# Patient Record
Sex: MALE | Race: WHITE | Employment: FULL TIME | URBAN - METROPOLITAN AREA
[De-identification: names, ages, dates, MRNs, and addresses within clinical notes are randomized per-mention and may not be internally consistent; named-entity substitution may affect disease eponyms.]

---

## 2021-10-22 ENCOUNTER — HOSPITAL ENCOUNTER (EMERGENCY)
Age: 26
Discharge: HOME | End: 2021-10-22

## 2021-10-22 VITALS
HEART RATE: 95 BPM | BODY MASS INDEX: 35.03 KG/M2 | WEIGHT: 218 LBS | TEMPERATURE: 100.8 F | OXYGEN SATURATION: 96 % | SYSTOLIC BLOOD PRESSURE: 143 MMHG | RESPIRATION RATE: 16 BRPM | HEIGHT: 66 IN | DIASTOLIC BLOOD PRESSURE: 85 MMHG

## 2021-10-22 DIAGNOSIS — R79.89 ELEVATED LFTS: ICD-10-CM

## 2021-10-22 DIAGNOSIS — U07.1 COVID-19 VIRUS INFECTION: ICD-10-CM

## 2021-10-22 DIAGNOSIS — R03.0 ELEVATED BLOOD PRESSURE READING: ICD-10-CM

## 2021-10-22 DIAGNOSIS — L05.01 PILONIDAL ABSCESS: Primary | ICD-10-CM

## 2021-10-22 LAB
A/G RATIO: 1.5 (ref 1.1–2.2)
ALBUMIN SERPL-MCNC: 5 G/DL (ref 3.4–5)
ALP BLD-CCNC: 138 U/L (ref 40–129)
ALT SERPL-CCNC: 104 U/L (ref 10–40)
ANION GAP SERPL CALCULATED.3IONS-SCNC: 14 MMOL/L (ref 3–16)
AST SERPL-CCNC: 59 U/L (ref 15–37)
BASOPHILS ABSOLUTE: 0.1 K/UL (ref 0–0.2)
BASOPHILS RELATIVE PERCENT: 0.6 %
BILIRUB SERPL-MCNC: 0.8 MG/DL (ref 0–1)
BUN BLDV-MCNC: 10 MG/DL (ref 7–20)
CALCIUM SERPL-MCNC: 10 MG/DL (ref 8.3–10.6)
CHLORIDE BLD-SCNC: 97 MMOL/L (ref 99–110)
CO2: 26 MMOL/L (ref 21–32)
CREAT SERPL-MCNC: 0.7 MG/DL (ref 0.9–1.3)
EOSINOPHILS ABSOLUTE: 0.1 K/UL (ref 0–0.6)
EOSINOPHILS RELATIVE PERCENT: 0.7 %
GFR AFRICAN AMERICAN: >60
GFR NON-AFRICAN AMERICAN: >60
GLOBULIN: 3.3 G/DL
GLUCOSE BLD-MCNC: 100 MG/DL (ref 70–99)
HCT VFR BLD CALC: 46.3 % (ref 40.5–52.5)
HEMOGLOBIN: 15.5 G/DL (ref 13.5–17.5)
INFLUENZA A: NOT DETECTED
INFLUENZA B: NOT DETECTED
LACTIC ACID, SEPSIS: 1.3 MMOL/L (ref 0.4–1.9)
LYMPHOCYTES ABSOLUTE: 2.4 K/UL (ref 1–5.1)
LYMPHOCYTES RELATIVE PERCENT: 16.7 %
MCH RBC QN AUTO: 27.8 PG (ref 26–34)
MCHC RBC AUTO-ENTMCNC: 33.5 G/DL (ref 31–36)
MCV RBC AUTO: 83 FL (ref 80–100)
MONOCYTES ABSOLUTE: 1.1 K/UL (ref 0–1.3)
MONOCYTES RELATIVE PERCENT: 7.6 %
NEUTROPHILS ABSOLUTE: 10.7 K/UL (ref 1.7–7.7)
NEUTROPHILS RELATIVE PERCENT: 74.4 %
PDW BLD-RTO: 13 % (ref 12.4–15.4)
PLATELET # BLD: 283 K/UL (ref 135–450)
PMV BLD AUTO: 7.4 FL (ref 5–10.5)
POTASSIUM REFLEX MAGNESIUM: 4.1 MMOL/L (ref 3.5–5.1)
RBC # BLD: 5.58 M/UL (ref 4.2–5.9)
SARS-COV-2 RNA, RT PCR: DETECTED
SODIUM BLD-SCNC: 137 MMOL/L (ref 136–145)
TOTAL PROTEIN: 8.3 G/DL (ref 6.4–8.2)
WBC # BLD: 14.4 K/UL (ref 4–11)

## 2021-10-22 PROCEDURE — 87205 SMEAR GRAM STAIN: CPT

## 2021-10-22 PROCEDURE — 80053 COMPREHEN METABOLIC PANEL: CPT

## 2021-10-22 PROCEDURE — 99283 EMERGENCY DEPT VISIT LOW MDM: CPT

## 2021-10-22 PROCEDURE — 6370000000 HC RX 637 (ALT 250 FOR IP): Performed by: PHYSICIAN ASSISTANT

## 2021-10-22 PROCEDURE — 6360000002 HC RX W HCPCS: Performed by: PHYSICIAN ASSISTANT

## 2021-10-22 PROCEDURE — 10080 I&D PILONIDAL CYST SIMPLE: CPT

## 2021-10-22 PROCEDURE — 2580000003 HC RX 258: Performed by: PHYSICIAN ASSISTANT

## 2021-10-22 PROCEDURE — 87636 SARSCOV2 & INF A&B AMP PRB: CPT

## 2021-10-22 PROCEDURE — 83605 ASSAY OF LACTIC ACID: CPT

## 2021-10-22 PROCEDURE — 87070 CULTURE OTHR SPECIMN AEROBIC: CPT

## 2021-10-22 PROCEDURE — 85025 COMPLETE CBC W/AUTO DIFF WBC: CPT

## 2021-10-22 PROCEDURE — 96374 THER/PROPH/DIAG INJ IV PUSH: CPT

## 2021-10-22 RX ORDER — ACETAMINOPHEN 325 MG/1
650 TABLET ORAL ONCE
Status: COMPLETED | OUTPATIENT
Start: 2021-10-22 | End: 2021-10-22

## 2021-10-22 RX ORDER — METRONIDAZOLE 500 MG/1
500 TABLET ORAL 3 TIMES DAILY
Qty: 21 TABLET | Refills: 0 | Status: SHIPPED | OUTPATIENT
Start: 2021-10-22 | End: 2021-10-29

## 2021-10-22 RX ORDER — KETOROLAC TROMETHAMINE 30 MG/ML
15 INJECTION, SOLUTION INTRAMUSCULAR; INTRAVENOUS ONCE
Status: COMPLETED | OUTPATIENT
Start: 2021-10-22 | End: 2021-10-22

## 2021-10-22 RX ORDER — CIPROFLOXACIN 500 MG/1
500 TABLET, FILM COATED ORAL 2 TIMES DAILY
Qty: 14 TABLET | Refills: 0 | Status: SHIPPED | OUTPATIENT
Start: 2021-10-22 | End: 2021-10-29

## 2021-10-22 RX ORDER — CIPROFLOXACIN 500 MG/1
500 TABLET, FILM COATED ORAL ONCE
Status: COMPLETED | OUTPATIENT
Start: 2021-10-22 | End: 2021-10-22

## 2021-10-22 RX ORDER — IBUPROFEN 800 MG/1
800 TABLET ORAL EVERY 8 HOURS PRN
Qty: 20 TABLET | Refills: 0 | Status: SHIPPED | OUTPATIENT
Start: 2021-10-22

## 2021-10-22 RX ORDER — 0.9 % SODIUM CHLORIDE 0.9 %
1000 INTRAVENOUS SOLUTION INTRAVENOUS ONCE
Status: COMPLETED | OUTPATIENT
Start: 2021-10-22 | End: 2021-10-22

## 2021-10-22 RX ORDER — METRONIDAZOLE 250 MG/1
500 TABLET ORAL ONCE
Status: COMPLETED | OUTPATIENT
Start: 2021-10-22 | End: 2021-10-22

## 2021-10-22 RX ADMIN — METRONIDAZOLE 500 MG: 250 TABLET ORAL at 19:34

## 2021-10-22 RX ADMIN — ACETAMINOPHEN 650 MG: 325 TABLET ORAL at 18:44

## 2021-10-22 RX ADMIN — SODIUM CHLORIDE 1000 ML: 9 INJECTION, SOLUTION INTRAVENOUS at 18:45

## 2021-10-22 RX ADMIN — KETOROLAC TROMETHAMINE 15 MG: 30 INJECTION, SOLUTION INTRAMUSCULAR at 18:44

## 2021-10-22 RX ADMIN — CIPROFLOXACIN 500 MG: 500 TABLET, FILM COATED ORAL at 19:34

## 2021-10-22 ASSESSMENT — ENCOUNTER SYMPTOMS
COLOR CHANGE: 1
SHORTNESS OF BREATH: 0
ABDOMINAL PAIN: 0
ROS SKIN COMMENTS: PILONIDAL ABSCESS
VOMITING: 0
COUGH: 1

## 2021-10-22 ASSESSMENT — PAIN SCALES - GENERAL
PAINLEVEL_OUTOF10: 8
PAINLEVEL_OUTOF10: 10

## 2021-10-22 ASSESSMENT — PAIN DESCRIPTION - PAIN TYPE: TYPE: ACUTE PAIN

## 2021-10-22 ASSESSMENT — PAIN DESCRIPTION - LOCATION: LOCATION: BUTTOCKS

## 2021-10-22 NOTE — ED PROVIDER NOTES
[address] [facility]  EMERGENCY DEPARTMENT ENCOUNTER        Pt Name: Madelyn Benitez  MRN: [id number]  [place] 1995  Date of evaluation: 10/22/2021  Provider: Link Porter PA-C  PCP: ZHANNA Hand CNP    Shared Visit or Autonomous Visit: LEN. I have evaluated this patient. My supervising physician was available for consultation. CHIEF COMPLAINT       Chief Complaint   Patient presents with    Cyst     Pt states that he has a cyst at the top of his buttocks that has been there since Tuesday. HISTORY OF PRESENT ILLNESS   (Location/Symptom, Timing/Onset, Context/Setting, Quality, Duration, Modifying Factors, Severity)  Note limiting factors. Madelyn Benitez is a 27 y.o. male presenting to the emergency department for evaluation of pilonidal abscess, states he had surgery for this in 2018 when he was in [place], symptoms returned the past 3 days with pain redness and swelling at the top of his buttocks. Fever headache body aches and cough today. States he just got his second Vasquez Peter COVID-19 vaccine today. No vomiting. No abdominal pain. No chest pain or shortness of breath. The history is provided by the patient. Abscess  Location:  Pelvis  Pelvic abscess location:  Gluteal cleft  Size:  3cm  Abscess quality: fluctuance, induration, painful and redness    Red streaking: no    Duration:  3 days  Progression:  Worsening  Chronicity:  New  Context: not diabetes    Associated symptoms: fatigue, fever and headaches    Associated symptoms: no vomiting          Nursing Notes were reviewed    REVIEW OF SYSTEMS    (2-9 systems for level 4, 10 or more for level 5)     Review of Systems   Constitutional: Positive for fatigue and fever. Respiratory: Positive for cough. Negative for shortness of breath. Cardiovascular: Negative for chest pain. Gastrointestinal: Negative for abdominal pain and vomiting. Musculoskeletal: Positive for myalgias.    Skin: Positive for color change (erythema). Pilonidal abscess   Neurological: Positive for headaches. All other systems reviewed and are negative. Positives and Pertinent negatives as per HPI. PAST MEDICAL HISTORY     Past Medical History:   Diagnosis Date    Allergic          SURGICAL HISTORY   History reviewed. No pertinent surgical history. CURRENTMEDICATIONS       Previous Medications    LORATADINE (CLEAR-ATADINE) 10 MG TABLET    Take 10 mg by mouth daily. ALLERGIES     Penicillins    FAMILYHISTORY       Family History   Problem Relation Age of Onset    Other Other         DRUG AND AL SYNDROME          SOCIAL HISTORY       Social History     Socioeconomic History    Marital status: Single     Spouse name: None    Number of children: None    Years of education: None    Highest education level: None   Occupational History    None   Tobacco Use    Smoking status: Current Some Day Smoker     Types: Cigarettes    Smokeless tobacco: Never Used   Substance and Sexual Activity    Alcohol use: Yes    Drug use: Never    Sexual activity: None   Other Topics Concern    None   Social History Narrative    None     Social Determinants of Health     Financial Resource Strain:     Difficulty of Paying Living Expenses:    Food Insecurity:     Worried About Running Out of Food in the Last Year:     [address] in the Last Year:    Transportation Needs:     Lack of Transportation (Medical):      Lack of Transportation (Non-Medical):    Physical Activity:     Days of Exercise per Week:     Minutes of Exercise per Session:    Stress:     Feeling of Stress :    Social Connections:     Frequency of Communication with Friends and Family:     Frequency of Social Gatherings with Friends and Family:     Attends Religion Services:     Active Member of Clubs or Organizations:     Attends Club or Organization Meetings:     Marital Status:    Intimate Partner Violence:     Fear of Current or Ex-Partner:     Emotionally Abused:     Physically Abused:     Sexually Abused:        SCREENINGS             PHYSICAL EXAM    (up to 7 for level 4, 8 or more for level 5)     ED Triage Vitals [10/22/21 1532]   BP Temp Temp Source Pulse Resp SpO2 Height Weight   130/85 100.8 °F (38.2 °C) Oral 107 16 100 % 5' 6\" (1.676 m) 218 lb (98.9 kg)       Physical Exam  Vitals and nursing note reviewed. Constitutional:       Appearance: He is well-developed. He is not toxic-appearing. HENT:      Head: Normocephalic and atraumatic. Mouth/Throat:      Pharynx: Oropharynx is clear. No pharyngeal swelling or posterior oropharyngeal erythema. Eyes:      Conjunctiva/sclera: Conjunctivae normal.      Pupils: Pupils are equal, round, and reactive to light. Cardiovascular:      Rate and Rhythm: Regular rhythm. Tachycardia present. Heart sounds: Normal heart sounds. Pulmonary:      Effort: Pulmonary effort is normal. No respiratory distress. Breath sounds: Normal breath sounds. No stridor. No wheezing, rhonchi or rales. Abdominal:      General: Bowel sounds are normal.      Palpations: Abdomen is soft. Abdomen is not rigid. Tenderness: There is no abdominal tenderness. There is no guarding or rebound. Musculoskeletal:         General: Normal range of motion. Cervical back: Normal range of motion and neck supple. Skin:     General: Skin is warm and dry. Comments: 3cm pilonidal abscess with tenderness, redness, induration and central fluctuance. No red streaks. No crepitus. Neurological:      Mental Status: He is alert and oriented to person, place, and time. GCS: GCS eye subscore is 4. GCS verbal subscore is 5. GCS motor subscore is 6. Cranial Nerves: No cranial nerve deficit. Sensory: No sensory deficit. Motor: No abnormal muscle tone.       Coordination: Coordination normal.   Psychiatric:         Speech: Speech normal.         Behavior: Behavior normal. Thought Content:  Thought content normal.         DIAGNOSTIC RESULTS   LABS:    Labs Reviewed   COVID-19 & INFLUENZA COMBO - Abnormal; Notable for the following components:       Result Value    SARS-CoV-2 RNA, RT PCR DETECTED (*)     All other components within normal limits    Narrative:     Performed at:  [facility],  [facility]   Phone [phone]   CBC WITH AUTO DIFFERENTIAL - Abnormal; Notable for the following components:    WBC 14.4 (*)     Neutrophils Absolute 10.7 (*)     All other components within normal limits    Narrative:     Performed at:  [facility] [employer],  [facility]   Phone [phone]   COMPREHENSIVE METABOLIC PANEL W/ REFLEX TO MG FOR LOW K - Abnormal; Notable for the following components:    Chloride 97 (*)     Glucose 100 (*)     CREATININE 0.7 (*)     Total Protein 8.3 (*)     Alkaline Phosphatase 138 (*)      (*)     AST 59 (*)     All other components within normal limits    Narrative:     Performed at:  [facility],  [facility] [employer]   Phone [phone]   CULTURE, WOUND   LACTATE, SEPSIS    Narrative:     Performed at:  [place] [address],  [facility]   Phone [phone]   LACTATE, SEPSIS     Results for orders placed or performed during the hospital encounter of 10/22/21   COVID-19 & Influenza Combo    Specimen: Nasopharyngeal Swab   Result Value Ref Range    SARS-CoV-2 RNA, RT PCR DETECTED (A) NOT DETECTED    INFLUENZA A NOT DETECTED NOT DETECTED    INFLUENZA B NOT DETECTED NOT DETECTED   CBC Auto Differential   Result Value Ref Range    WBC 14.4 (H) 4.0 - 11.0 K/uL    RBC 5.58 4.20 - 5.90 M/uL    Hemoglobin 15.5 13.5 - 17.5 g/dL    Hematocrit 46.3 40.5 - 52.5 %    MCV 83.0 80.0 - 100.0 fL    MCH 27.8 26.0 - 34.0 pg    MCHC 33.5 31.0 - 36.0 g/dL RDW 13.0 12.4 - 15.4 %    Platelets 949 872 - 554 K/uL    MPV 7.4 5.0 - 10.5 fL    Neutrophils % 74.4 %    Lymphocytes % 16.7 %    Monocytes % 7.6 %    Eosinophils % 0.7 %    Basophils % 0.6 %    Neutrophils Absolute 10.7 (H) 1.7 - 7.7 K/uL    Lymphocytes Absolute 2.4 1.0 - 5.1 K/uL    Monocytes Absolute 1.1 0.0 - 1.3 K/uL    Eosinophils Absolute 0.1 0.0 - 0.6 K/uL    Basophils Absolute 0.1 0.0 - 0.2 K/uL   Comprehensive Metabolic Panel w/ Reflex to MG   Result Value Ref Range    Sodium 137 136 - 145 mmol/L    Potassium reflex Magnesium 4.1 3.5 - 5.1 mmol/L    Chloride 97 (L) 99 - 110 mmol/L    CO2 26 21 - 32 mmol/L    Anion Gap 14 3 - 16    Glucose 100 (H) 70 - 99 mg/dL    BUN 10 7 - 20 mg/dL    CREATININE 0.7 (L) 0.9 - 1.3 mg/dL    GFR Non-African American >60 >60    GFR African American >60 >60    Calcium 10.0 8.3 - 10.6 mg/dL    Total Protein 8.3 (H) 6.4 - 8.2 g/dL    Albumin 5.0 3.4 - 5.0 g/dL    Albumin/Globulin Ratio 1.5 1.1 - 2.2    Total Bilirubin 0.8 0.0 - 1.0 mg/dL    Alkaline Phosphatase 138 (H) 40 - 129 U/L     (H) 10 - 40 U/L    AST 59 (H) 15 - 37 U/L    Globulin 3.3 Not Established g/dL   Lactate, Sepsis   Result Value Ref Range    Lactic Acid, Sepsis 1.3 0.4 - 1.9 mmol/L         All other labs were within normal range or not returned as of this dictation. EKG: All EKG's are interpreted by the Emergency Department Physician in the absence of a cardiologist.  Please see their note for interpretation of EKG. RADIOLOGY:   Non-plain film images such as CT, Ultrasound and MRI are read by the radiologist. Plain radiographic images are visualized andpreliminarily interpreted by the  ED Provider with the below findings:        Interpretation perthe Radiologist below, if available at the time of this note:    No orders to display     No results found.         PROCEDURES   Unless otherwise noted below, none     Incision/Drainage    Date/Time: 10/22/2021 7:05 PM  Performed by: Winsome Sutherland, SHELLEY  Authorized by: Kristan Burdick PA-C     Consent:     Consent obtained:  Verbal    Consent given by:  Patient    Risks discussed:  Bleeding, incomplete drainage and pain  Location:     Type:  Abscess    Size:  3cm    Location:  Anogenital    Anogenital location:  Pilonidal  Pre-procedure details:     Skin preparation:  Betadine  Anesthesia (see MAR for exact dosages): Anesthesia method:  Local infiltration    Local anesthetic:  Lidocaine 1% WITH epi (3cc)  Procedure type:     Complexity:  Simple  Procedure details:     Incision types:  Single straight (1cm)    Incision depth:  Dermal    Scalpel blade:  11    Wound management:  Probed and deloculated    Drainage:  Purulent    Drainage amount:  Copious    Packing materials:  1/4 in gauze  Post-procedure details:     Patient tolerance of procedure: Tolerated well, no immediate complications        CRITICAL CARE TIME   N/A    CONSULTS:  None      EMERGENCY DEPARTMENT COURSE and DIFFERENTIAL DIAGNOSIS/MDM:   Vitals:    Vitals:    10/22/21 1532 10/22/21 1915   BP: 130/85 (!) 149/98   Pulse: 107 98   Resp: 16    Temp: 100.8 °F (38.2 °C)    TempSrc: Oral    SpO2: 100% 95%   Weight: 218 lb (98.9 kg)    Height: 5' 6\" (1.676 m)        Patient was given thefollowing medications:  Medications   0.9 % sodium chloride bolus (1,000 mLs IntraVENous New Bag 10/22/21 1845)   ciprofloxacin (CIPRO) tablet 500 mg (has no administration in time range)   metroNIDAZOLE (FLAGYL) tablet 500 mg (has no administration in time range)   ketorolac (TORADOL) injection 15 mg (15 mg IntraVENous Given 10/22/21 1844)   acetaminophen (TYLENOL) tablet 650 mg (650 mg Oral Given 10/22/21 1844)         ED course  Patient presented to the ER for evaluation of pilonidal abscess. Low-grade fever here on arrival 100.8. States he has not felt well for the past couple of days having a headache body aches and cough. Covid swab was obtained and is positive here.   On exam he has 3 cm pilonidal abscess medications on file              (Please note that portions ofthis note were completed with a voice recognition program.  Efforts were made to edit the dictations but occasionally words are mis-transcribed.)    John Renee PA-C (electronically signed)            Buster Jacob PA-C  10/22/21 1927

## 2021-10-24 LAB
GRAM STAIN RESULT: ABNORMAL
WOUND/ABSCESS: ABNORMAL

## 2021-10-25 ENCOUNTER — CARE COORDINATION (OUTPATIENT)
Dept: CARE COORDINATION | Age: 26
End: 2021-10-25

## 2021-10-25 NOTE — CARE COORDINATION
ACM called to follow up with patient from ER visit 10/22/2021. Left message on machine, waiting for return call.

## 2021-10-26 ENCOUNTER — CARE COORDINATION (OUTPATIENT)
Dept: CARE COORDINATION | Age: 26
End: 2021-10-26

## 2022-07-19 ENCOUNTER — HOSPITAL ENCOUNTER (EMERGENCY)
Age: 27
Discharge: HOME | End: 2022-07-19
Payer: OTHER GOVERNMENT

## 2022-07-19 VITALS
WEIGHT: 220 LBS | TEMPERATURE: 98.3 F | SYSTOLIC BLOOD PRESSURE: 150 MMHG | HEIGHT: 67 IN | OXYGEN SATURATION: 99 % | DIASTOLIC BLOOD PRESSURE: 92 MMHG | BODY MASS INDEX: 34.53 KG/M2 | HEART RATE: 90 BPM | RESPIRATION RATE: 16 BRPM

## 2022-07-19 DIAGNOSIS — L05.01 PILONIDAL ABSCESS: Primary | ICD-10-CM

## 2022-07-19 PROCEDURE — 10080 I&D PILONIDAL CYST SIMPLE: CPT

## 2022-07-19 PROCEDURE — 99283 EMERGENCY DEPT VISIT LOW MDM: CPT

## 2022-07-19 PROCEDURE — 6370000000 HC RX 637 (ALT 250 FOR IP): Performed by: PHYSICIAN ASSISTANT

## 2022-07-19 RX ORDER — SULFAMETHOXAZOLE AND TRIMETHOPRIM 800; 160 MG/1; MG/1
1 TABLET ORAL 2 TIMES DAILY
Qty: 20 TABLET | Refills: 0 | Status: SHIPPED | OUTPATIENT
Start: 2022-07-19 | End: 2022-07-29

## 2022-07-19 RX ORDER — CEPHALEXIN 500 MG/1
500 CAPSULE ORAL 4 TIMES DAILY
Qty: 28 CAPSULE | Refills: 0 | Status: SHIPPED | OUTPATIENT
Start: 2022-07-19 | End: 2022-07-26

## 2022-07-19 RX ORDER — CEPHALEXIN 500 MG/1
500 CAPSULE ORAL ONCE
Status: COMPLETED | OUTPATIENT
Start: 2022-07-19 | End: 2022-07-19

## 2022-07-19 RX ORDER — SULFAMETHOXAZOLE AND TRIMETHOPRIM 800; 160 MG/1; MG/1
1 TABLET ORAL ONCE
Status: COMPLETED | OUTPATIENT
Start: 2022-07-19 | End: 2022-07-19

## 2022-07-19 RX ADMIN — CEPHALEXIN 500 MG: 500 CAPSULE ORAL at 11:41

## 2022-07-19 RX ADMIN — SULFAMETHOXAZOLE AND TRIMETHOPRIM 1 TABLET: 800; 160 TABLET ORAL at 11:41

## 2022-07-19 ASSESSMENT — ENCOUNTER SYMPTOMS
CONSTIPATION: 0
CHEST TIGHTNESS: 0
ABDOMINAL PAIN: 0
SORE THROAT: 0
NAUSEA: 0
SINUS PRESSURE: 0
COUGH: 0
DIARRHEA: 0
EYE DISCHARGE: 0
RHINORRHEA: 0
SINUS PAIN: 0
SHORTNESS OF BREATH: 0
VOMITING: 0
EYE REDNESS: 0

## 2022-07-19 ASSESSMENT — PAIN DESCRIPTION - FREQUENCY: FREQUENCY: CONTINUOUS

## 2022-07-19 ASSESSMENT — PAIN - FUNCTIONAL ASSESSMENT
PAIN_FUNCTIONAL_ASSESSMENT: 0-10
PAIN_FUNCTIONAL_ASSESSMENT: PREVENTS OR INTERFERES WITH MANY ACTIVE NOT PASSIVE ACTIVITIES

## 2022-07-19 ASSESSMENT — PAIN DESCRIPTION - LOCATION: LOCATION: BUTTOCKS

## 2022-07-19 ASSESSMENT — PAIN DESCRIPTION - ORIENTATION: ORIENTATION: INNER

## 2022-07-19 ASSESSMENT — PAIN DESCRIPTION - PAIN TYPE: TYPE: ACUTE PAIN

## 2022-07-19 ASSESSMENT — LIFESTYLE VARIABLES: HOW OFTEN DO YOU HAVE A DRINK CONTAINING ALCOHOL: MONTHLY OR LESS

## 2022-07-19 ASSESSMENT — PAIN DESCRIPTION - ONSET: ONSET: ON-GOING

## 2022-07-19 ASSESSMENT — PAIN DESCRIPTION - DESCRIPTORS: DESCRIPTORS: DISCOMFORT

## 2022-07-19 ASSESSMENT — PAIN SCALES - GENERAL: PAINLEVEL_OUTOF10: 8

## 2022-07-19 NOTE — ED NOTES
Patient discharged in stable, ambulatory condition with all documented belongings. This nurse reviewed discharge instructions, pt verbalized understanding.        Zak Rader RN  07/19/22 0763

## 2022-07-19 NOTE — DISCHARGE INSTRUCTIONS
Take all antibiotics until complete  Use warm compresses to the area 3-5 times per day. Please return or see your primary care provider in 2 to 3 days for a wound recheck.

## 2022-07-19 NOTE — ED PROVIDER NOTES
[address] ED  EMERGENCY DEPARTMENT ENCOUNTER        Pt Name: Freya Gorman  MRN: [id number]  [place] 1995  Date of evaluation: 7/19/2022  Provider: David Prado PA-C  PCP: ZHANNA Lozano CNP  ED Attending: No att. providers found      This patient was not seen and evaluated by the attending physician   I have independently evaluated this patient. CHIEF COMPLAINT       Chief Complaint   Patient presents with    Abscess     By tailbone. Reports noticed it Sunday, worse now. Hard to walk, sit, stand or sleep       HISTORY OF PRESENT ILLNESS   (Location/Symptom, Timing/Onset, Context/Setting, Quality, Duration, Modifying Factors, Severity)  Note limiting factors. Freya Gorman is a 27 y.o. male for evaluation of a 4-day history of a painful area of swelling to his gluteal cleft. Gradual onset of worsening symptoms. 6 out of 10 throbbing aching pain, worse with palpation of the area sitting and movement. Not improved by anything. He advised himself and his friend tried to drain it with no improvement, no active drainage at this time no fevers. Nursing Notes were all reviewed and agreed with or any disagreements were addressed  in the HPI. REVIEW OF SYSTEMS  (2-9 systems for level 4, 10 or more for level 5)     Review of Systems   Constitutional:  Negative for chills and fever. HENT: Negative. Negative for congestion, rhinorrhea, sinus pressure, sinus pain and sore throat. Eyes:  Negative for discharge, redness and visual disturbance. Respiratory:  Negative for cough, chest tightness and shortness of breath. Cardiovascular:  Negative for chest pain and palpitations. Gastrointestinal:  Negative for abdominal pain, constipation, diarrhea, nausea and vomiting. Genitourinary:  Negative for difficulty urinating, dysuria and frequency. Musculoskeletal: Negative. Skin:  Positive for wound. Neurological: Negative.   Negative for dizziness, weakness, numbness and headaches. Psychiatric/Behavioral: Negative. All other systems reviewed and are negative. Positivesand Pertinent negatives as per HPI. Except as noted above in the ROS, all other systems were reviewed and negative. PAST MEDICAL HISTORY     Past Medical History:   Diagnosis Date    Allergic     COVID 10/22/2021         SURGICAL HISTORY     No past surgical history on file. CURRENT MEDICATIONS       Discharge Medication List as of 7/19/2022 11:38 AM        CONTINUE these medications which have NOT CHANGED    Details   ibuprofen (ADVIL;MOTRIN) 800 MG tablet Take 1 tablet by mouth every 8 hours as needed for Pain or Fever, Disp-20 tablet, R-0Normal      loratadine (CLARITIN) 10 MG tablet Take 10 mg by mouth daily. Historical Med               ALLERGIES     Penicillins    FAMILY HISTORY       Family History   Problem Relation Age of Onset    Other Other         DRUG AND AL SYNDROME         SOCIAL HISTORY       Social History     Socioeconomic History    Marital status: Single   Tobacco Use    Smoking status: Some Days     Types: Cigarettes    Smokeless tobacco: Never   Substance and Sexual Activity    Alcohol use: Yes    Drug use: Never       SCREENINGS     NIH Score       [place] Coma Scale  Eye Opening: Spontaneous  Best Verbal Response: Oriented  Best Motor Response: Obeys commands  Wood Coma Scale Score: 15    [facility]     Heart Score         PHYSICAL EXAM    (up to 7 for level 4, 8 ormore for level 5)     ED Triage Vitals [07/19/22 0943]   BP Temp Temp src Heart Rate Resp SpO2 Height Weight   (!) 150/92 98.3 °F (36.8 °C) -- 99 18 96 % 5' 7\" (1.702 m) 220 lb (99.8 kg)       Physical Exam  Vitals and nursing note reviewed. Constitutional:       Appearance: Normal appearance. He is well-developed. He is not diaphoretic. HENT:      Head: Normocephalic and atraumatic.       Nose: Nose normal.      Mouth/Throat:      Mouth: Mucous membranes are moist. for SEP-1 Core Measure due to:  2+ SIRS criteria are not met     CONSULTS:  None      EMERGENCY DEPARTMENT COURSE and DIFFERENTIAL DIAGNOSIS/MDM:   Vitals:    Vitals:    07/19/22 0943 07/19/22 1143   BP: (!) 150/92    Pulse: 99 90   Resp: 18 16   Temp: 98.3 °F (36.8 °C)    SpO2: 96% 99%   Weight: 220 lb (99.8 kg)    Height: 5' 7\" (1.702 m)        Patient was given the following medications:  Medications   sulfamethoxazole-trimethoprim (BACTRIM DS;SEPTRA DS) 800-160 MG per tablet 1 tablet (1 tablet Oral Given 7/19/22 1141)   cephALEXin (KEFLEX) capsule 500 mg (500 mg Oral Given 7/19/22 1141)         Afebrile, stable, patient presents to the ED for evaluation. Patients SPO2 is 96% on room air they are not hypoxic. I&D is performed antibiotics are initiated. Patient will be provided with general surgery to follow-up as this is been a recurrent abscess. All questions are answered. Indications for return to the ED are discussed. Patient is advised if any new or worsening symptoms arise they should immediately return to the emergency room. Follow-up with primary care in 1-2 days. The patient tolerated their visit well. The patient and / or the family were informed of the results of any tests, a time was given to answer questions, a plan was proposed and they agreed Kimberlyn Oh. I estimate there is LOW risk for CELLULITIS, COMPARTMENT SYNDROME, NECROTIZING FASCIITIS, TENDON OR NEUROVASCULAR INJURY, or FOREIGN BODY, thus I consider the discharge disposition reasonable. Also, there is no evidence or peritonitis, sepsis, or toxicity. Kassidy Hodge and I have discussed the diagnosis and risks, and we agree with discharging home to follow-up with their primary doctor. We also discussed returning to the Emergency Department immediately if new or worsening symptoms occur.  We have discussed the symptoms which are most concerning (e.g., changing or worsening pain, fever, numbness, weakness, cool or painful digits) that necessitate immediate return. Final Impression    1. Pilonidal abscess        Discharge Vital Signs:  Blood pressure (!) 150/92, pulse 90, temperature 98.3 °F (36.8 °C), resp. rate 16, height 5' 7\" (1.702 m), weight 220 lb (99.8 kg), SpO2 99 %. PATIENT REFERRED TO:  Harry Room, APRN - CNP  [address] [place]  [phone]      For wound re-check in 2-3 days    [address] [place] [address]  [phone]        DISCHARGE MEDICATIONS:  Discharge Medication List as of 7/19/2022 11:38 AM        START taking these medications    Details   cephALEXin (KEFLEX) 500 MG capsule Take 1 capsule by mouth in the morning and 1 capsule at noon and 1 capsule in the evening and 1 capsule before bedtime. Do all this for 7 days. , Disp-28 capsule, R-0Normal      sulfamethoxazole-trimethoprim (BACTRIM DS) 800-160 MG per tablet Take 1 tablet by mouth in the morning and 1 tablet before bedtime. Do all this for 10 days. , Disp-20 tablet, R-0Normal             DISCONTINUED MEDICATIONS:  Discharge Medication List as of 7/19/2022 11:38 AM                 Pt was seen during the COVID 19 pandemic. Appropriate PPE worn by ME during patient encounters. Pt seen during a time with constrained hospital bed capacity and other potential inpatient and outpatient resources were constrained due to the viral pandemic.    Please note that portions of this note were completed with a voice recognition program.  Efforts were made to edit the dictations but occasionally words are mis-transcribed.)    Lyly Jolley PA-C (electronically signed)        Lyly Jolley PA-C  07/20/22 1076

## 2022-07-21 ENCOUNTER — HOSPITAL ENCOUNTER (EMERGENCY)
Age: 27
Discharge: HOME | End: 2022-07-21
Payer: OTHER GOVERNMENT

## 2022-07-21 VITALS
OXYGEN SATURATION: 98 % | HEART RATE: 97 BPM | BODY MASS INDEX: 34.53 KG/M2 | TEMPERATURE: 98 F | WEIGHT: 220 LBS | SYSTOLIC BLOOD PRESSURE: 152 MMHG | DIASTOLIC BLOOD PRESSURE: 93 MMHG | RESPIRATION RATE: 19 BRPM | HEIGHT: 67 IN

## 2022-07-21 DIAGNOSIS — L05.01 PILONIDAL ABSCESS: Primary | ICD-10-CM

## 2022-07-21 PROCEDURE — 87077 CULTURE AEROBIC IDENTIFY: CPT

## 2022-07-21 PROCEDURE — 99283 EMERGENCY DEPT VISIT LOW MDM: CPT

## 2022-07-21 PROCEDURE — 87070 CULTURE OTHR SPECIMN AEROBIC: CPT

## 2022-07-21 PROCEDURE — 10080 I&D PILONIDAL CYST SIMPLE: CPT

## 2022-07-21 PROCEDURE — 87186 SC STD MICRODIL/AGAR DIL: CPT

## 2022-07-21 PROCEDURE — 87205 SMEAR GRAM STAIN: CPT

## 2022-07-21 PROCEDURE — 10060 I&D ABSCESS SIMPLE/SINGLE: CPT

## 2022-07-21 ASSESSMENT — PAIN - FUNCTIONAL ASSESSMENT: PAIN_FUNCTIONAL_ASSESSMENT: NONE - DENIES PAIN

## 2022-07-21 NOTE — ED PROVIDER NOTES
[address] ED  EMERGENCY DEPARTMENT ENCOUNTER        Pt Name: Bahman Lui  MRN: [id number]  [place] 1995  Date of evaluation: 7/21/2022  Provider: ZHANNA Chahal CNP  PCP: ZHANNA Barrientos CNP  Note Started: 11:24 AM EDT      LEN. I have evaluated this patient. My supervising physician was available for consultation. Triage CHIEF COMPLAINT       Chief Complaint   Patient presents with    Abscess     Patient just seen here Tuesday for abscess. It was drained and patient discharged with two antibiotics but \"it is not getting any better\"         HISTORY OF PRESENT ILLNESS   (Location/Symptom, Timing/Onset, Context/Setting, Quality, Duration, Modifying Factors, Severity)  Note limiting factors. Chief Complaint: Abscess/pilonidal cyst    Bahman Lui is a 27 y.o. male who presents to the emergency department symptoms of an abscess/pilonidal cyst.  Patient reported that approximately 3 days he has had some inflammation to the pilonidal cyst.  He states that Tuesday he had the abscess drained. States that today he is noted the drainage stopped and getting tender again. Denies any fever. No neck pain or back pain. No scrotal or rectal pain. Nursing Notes were all reviewed and agreed with or any disagreements were addressed in the HPI. REVIEW OF SYSTEMS    (2-9 systems for level 4, 10 or more for level 5)     Review of Systems   Constitutional:  Negative for chills, diaphoresis and fever. HENT:  Negative for congestion, ear pain, rhinorrhea and sore throat. Eyes:  Negative for pain and visual disturbance. Respiratory:  Negative for cough and shortness of breath. Cardiovascular:  Negative for chest pain and leg swelling. Gastrointestinal:  Negative for abdominal pain, blood in stool, diarrhea, nausea and vomiting. Genitourinary:  Negative for difficulty urinating, dysuria, flank pain and frequency.    Musculoskeletal:  Negative for back pain and neck pain. Skin:  Negative for rash and wound. Abscess/pilonidal cyst infected. Neurological:  Negative for dizziness and light-headedness. PAST MEDICAL HISTORY     Past Medical History:   Diagnosis Date    Allergic     COVID 10/22/2021       SURGICAL HISTORY   No past surgical history on file. [address]       Discharge Medication List as of 7/21/2022 11:39 AM        CONTINUE these medications which have NOT CHANGED    Details   cephALEXin (KEFLEX) 500 MG capsule Take 1 capsule by mouth in the morning and 1 capsule at noon and 1 capsule in the evening and 1 capsule before bedtime. Do all this for 7 days. , Disp-28 capsule, R-0Normal      sulfamethoxazole-trimethoprim (BACTRIM DS) 800-160 MG per tablet Take 1 tablet by mouth in the morning and 1 tablet before bedtime. Do all this for 10 days. , Disp-20 tablet, R-0Normal      ibuprofen (ADVIL;MOTRIN) 800 MG tablet Take 1 tablet by mouth every 8 hours as needed for Pain or Fever, Disp-20 tablet, R-0Normal      loratadine (CLARITIN) 10 MG tablet Take 10 mg by mouth daily. Historical Med             ALLERGIES     Penicillins    FAMILYHISTORY       Family History   Problem Relation Age of Onset    Other Other         DRUG AND AL SYNDROME        SOCIAL HISTORY       Social History     Socioeconomic History    Marital status: Single   Tobacco Use    Smoking status: Some Days     Types: Cigarettes    Smokeless tobacco: Never   Substance and Sexual Activity    Alcohol use: Yes    Drug use: Never       SCREENINGS    Lagrange Coma Scale  Eye Opening: Spontaneous  Best Verbal Response: Oriented  Best Motor Response: Obeys commands  Baljeet Coma Scale Score: 15        PHYSICAL EXAM    (up to 7 for level 4, 8 or more for level 5)     ED Triage Vitals [07/21/22 1010]   BP Temp Temp Source Heart Rate Resp SpO2 Height Weight   (!) 152/93 98 °F (36.7 °C) Oral 97 19 98 % 5' 7\" (1.702 m) 220 lb (99.8 kg)       Physical Exam  Vitals and nursing note reviewed. Constitutional:       Appearance: Normal appearance. He is not toxic-appearing or diaphoretic. HENT:      Head: Normocephalic and atraumatic. Nose: Nose normal.   Eyes:      General:         Right eye: No discharge. Left eye: No discharge. Cardiovascular:      Rate and Rhythm: Normal rate and regular rhythm. Pulses: Normal pulses. Heart sounds: No murmur heard. Pulmonary:      Effort: Pulmonary effort is normal. No respiratory distress. Breath sounds: No wheezing or rhonchi. Abdominal:      Palpations: Abdomen is soft. Tenderness: There is no abdominal tenderness. There is no guarding or rebound. Musculoskeletal:         General: Normal range of motion. Cervical back: Normal range of motion and neck supple. Skin:     General: Skin is warm and dry. Capillary Refill: Capillary refill takes less than 2 seconds. Findings: Abscess present. Comments: Patient is noted to have a pilonidal cyst that appears to be inflamed. The area that was opened and incised on the cyst is now being closed over and healing. No drainage noted. Mild fluctuance on examination. Abscess is approximately 2.5 cm in diameter. Neurological:      General: No focal deficit present. Mental Status: He is alert and oriented to person, place, and time. Psychiatric:         Mood and Affect: Mood normal.         Behavior: Behavior normal.       DIAGNOSTIC RESULTS   LABS:    Labs Reviewed   CULTURE, WOUND - Abnormal; Notable for the following components:       Result Value    Organism Escherichia coli (*)     All other components within normal limits    Narrative:     ORDER#: [id number]                          ORDERED BY: Anabelle Mckeon  SOURCE: Abscess No site given              COLLECTED:  07/21/22 11:42  ANTIBIOTICS AT JC.:                      RECEIVED :  07/21/22 18:21       When ordered, only abnormal lab results are displayed.  All other labs were within normal range or not returned as of this dictation. EKG: When ordered, EKG's are interpreted by the Emergency Department Physician in the absence of a cardiologist.  Please see their note for interpretation of EKG. RADIOLOGY:   Non-plain film images such as CT, Ultrasound and MRI are read by the radiologist. Plain radiographic images are visualized andpreliminarily interpreted by the  ED Provider with the below findings:        Interpretation perthe Radiologist below, if available at the time of this note:    No orders to display     No results found. PROCEDURES   Unless otherwise noted below, none     Incision/Drainage    Date/Time: 7/21/2022 11:24 AM  Performed by: ZHANNA Armendariz CNP  Authorized by: ZHANNA Armendariz CNP     Consent:     Consent obtained:  Verbal    Consent given by:  Patient    Risks discussed:  Bleeding, incomplete drainage and pain    Alternatives discussed:  No treatment  Universal protocol:     Patient identity confirmed:  Verbally with patient  Location:     Type:  Abscess    Location: Pilonidal cyst.  Sedation:     Sedation type:  None  Anesthesia:     Anesthesia method:  Local infiltration  Procedure type:     Complexity:  Simple  Procedure details:     Ultrasound guidance: no      Needle aspiration: no      Incision types:  Cruciate    Incision depth:  Dermal    Wound management:  Probed and deloculated, irrigated with saline and extensive cleaning    Drainage:  Purulent and serosanguinous    Drainage amount:   Moderate    Wound treatment:  Wound left open    Packing materials:  None  Post-procedure details:     Procedure completion:  Tolerated well, no immediate complications    CRITICAL CARE TIME   N/A    CONSULTS:  None      EMERGENCY DEPARTMENT COURSE and DIFFERENTIAL DIAGNOSIS/MDM:   Vitals:    Vitals:    07/21/22 1010   BP: (!) 152/93   Pulse: 97   Resp: 19   Temp: 98 °F (36.7 °C)   TempSrc: Oral   SpO2: 98%   Weight: 220 lb (99.8 kg)   Height: 5' 7\" (1.702 m) Patient was given thefollowing medications:  Medications - No data to display      Is this patient to be included in the SEP-1 Core Measure due to severe sepsis or septic shock? No   Exclusion criteria - the patient is NOT to be included for SEP-1 Core Measure due to: Infection is not suspected    I sent the wound all culture off for evaluation. Patient is currently taking Keflex and Bactrim. Patient is well-appearing at this time. His abscess was incised and drained. Abscess was washed. I did go ahead and place the patient recently seen by Dr. Alexis Rincon on Monday at 2 PM.  Patient was provided that information and directed on where to go and where he will be evaluated. The patient verbalized understanding and agrees with the treatment plan and follow-up on Monday. No other acute complaints noted at this time and he will be discharged home in good condition. FINAL IMPRESSION      1. Pilonidal abscess          DISPOSITION/PLAN   DISPOSITION Decision To Discharge 07/21/2022 11:27:04 AM      PATIENT REFERREDTO:  ZHANNA Gibson CNP  [address] [facility] [address]    Schedule an appointment as soon as possible for a visit       Pelon Torres MD  [address] [place] [address] [address] Bird [address]sor[address] [phone]    Go to   On Monday at 2:00 PM you have an appointment to be seen.     DISCHARGE MEDICATIONS:  Discharge Medication List as of 7/21/2022 11:39 AM          DISCONTINUED MEDICATIONS:  Discharge Medication List as of 7/21/2022 11:39 AM                 (Please note that portions ofthis note were completed with a voice recognition program.  Efforts were made to edit the dictations but occasionally words are mis-transcribed.)    ZHANNA Koch CNP (electronically signed)             ZHANNA Koch CNP  07/23/22 1951

## 2022-07-23 LAB
GRAM STAIN RESULT: ABNORMAL
ORGANISM: ABNORMAL
WOUND/ABSCESS: ABNORMAL

## 2022-07-23 ASSESSMENT — ENCOUNTER SYMPTOMS
VOMITING: 0
DIARRHEA: 0
EYE PAIN: 0
RHINORRHEA: 0
ABDOMINAL PAIN: 0
BACK PAIN: 0
SORE THROAT: 0
SHORTNESS OF BREATH: 0
BLOOD IN STOOL: 0
NAUSEA: 0
COUGH: 0

## 2022-12-05 ENCOUNTER — NURSE TRIAGE (OUTPATIENT)
Dept: OTHER | Facility: CLINIC | Age: 27
End: 2022-12-05

## 2022-12-05 NOTE — TELEPHONE ENCOUNTER
Location of patient: [facility] Zayda Gutierrez call from Maxi Escalera at [employer] with [employer]. Subjective: Caller states \"I have been really tired. \"     Current Symptoms: Fatigue. No trouble breathing. Onset: 2-3 weeks ago; unchanged    Associated Symptoms: reduced activity, increased sleepiness    Pain Severity: 0/10; N/A; none    Temperature: denies fever     LMP: NA Pregnant: NA    Recommended disposition: Go to [facility]    Care advice provided, patient verbalizes understanding; denies any other questions or concerns; instructed to call back for any new or worsening symptoms. Patient/Caller agrees with recommended disposition; writer provided warm transfer to Pixelle at [employer] for appointment scheduling    Attention Provider: Thank you for allowing me to participate in the care of your patient. The patient was connected to triage in response to information provided to the ECC/PSC. Please do not respond through this encounter as the response is not directed to a shared pool.     Reason for Disposition   MODERATE weakness (i.e., interferes with work, school, normal activities) and cause unknown  (Exceptions: Weakness with acute minor illness, or weakness from poor fluid intake.)    Protocols used: Weakness (Generalized) and Fatigue-ADULT-[place]

## 2022-12-20 ENCOUNTER — OFFICE VISIT (OUTPATIENT)
Dept: FAMILY MEDICINE CLINIC | Age: 27
End: 2022-12-20
Payer: OTHER GOVERNMENT

## 2022-12-20 VITALS
BODY MASS INDEX: 35.58 KG/M2 | SYSTOLIC BLOOD PRESSURE: 130 MMHG | WEIGHT: 227.2 LBS | OXYGEN SATURATION: 97 % | TEMPERATURE: 97.6 F | HEART RATE: 98 BPM | DIASTOLIC BLOOD PRESSURE: 82 MMHG

## 2022-12-20 DIAGNOSIS — R53.83 FATIGUE, UNSPECIFIED TYPE: Primary | ICD-10-CM

## 2022-12-20 DIAGNOSIS — R06.83 SNORING: ICD-10-CM

## 2022-12-20 DIAGNOSIS — R06.81 WITNESSED EPISODE OF APNEA: ICD-10-CM

## 2022-12-20 PROCEDURE — 99204 OFFICE O/P NEW MOD 45 MIN: CPT | Performed by: NURSE PRACTITIONER

## 2022-12-20 PROCEDURE — 36415 COLL VENOUS BLD VENIPUNCTURE: CPT | Performed by: NURSE PRACTITIONER

## 2022-12-20 ASSESSMENT — PATIENT HEALTH QUESTIONNAIRE - PHQ9
2. FEELING DOWN, DEPRESSED OR HOPELESS: 0
SUM OF ALL RESPONSES TO PHQ QUESTIONS 1-9: 0
1. LITTLE INTEREST OR PLEASURE IN DOING THINGS: 0
SUM OF ALL RESPONSES TO PHQ9 QUESTIONS 1 & 2: 0
SUM OF ALL RESPONSES TO PHQ QUESTIONS 1-9: 0

## 2022-12-20 ASSESSMENT — ENCOUNTER SYMPTOMS
EYES NEGATIVE: 1
RESPIRATORY NEGATIVE: 1
GASTROINTESTINAL NEGATIVE: 1

## 2022-12-20 NOTE — PROGRESS NOTES
Subjective:      Patient ID: Tonio Parsons is a 24 y.o. male. Chief Complaint   Patient presents with    New Patient     Sleep apnea        HPI    Patient presents today to re-establish care with myself and with concerns of chronic fatigue and witness apneic episodes. Fatigue  Patient complains of fatigue. Symptoms began  several months ago . The patient feels the fatigue began with: a witnessed or suspected sleep apnea. Patient also reports family states she snores very loud and patient also admits he wakes self up gasping for air at times. Patient describes the following psychological symptoms: none. Patient denies change in hair texture, cold intolerance, constipation, exercise intolerance, fever, GI blood loss, significant change in weight, symptoms of arthritis, and unusual rashes. Symptoms have stabilized. Symptom severity: symptoms bothersome, but easily able to carry out all usual work/school/family activities. Previous visits for this problem: none. Review of Systems   Constitutional:  Positive for fatigue. Negative for appetite change, chills and fever. HENT: Negative. Eyes: Negative. Respiratory: Negative. Cardiovascular: Negative. Gastrointestinal: Negative. Endocrine: Negative. Musculoskeletal: Negative. Skin: Negative. Neurological: Negative. Psychiatric/Behavioral:  Positive for sleep disturbance. Patient Active Problem List   Diagnosis    Allergic rhinitis       No outpatient medications have been marked as taking for the 12/20/22 encounter (Office Visit) with ZHANNA Song CNP.        Allergies   Allergen Reactions    Penicillins        Social History     Tobacco Use    Smoking status: Every Day     Years: 1.00     Types: Cigarettes    Smokeless tobacco: Never   Substance Use Topics    Alcohol use: Yes       Objective:   /82   Pulse 98   Temp 97.6 °F (36.4 °C) (Oral)   Wt 227 lb 3.2 oz (103.1 kg)   SpO2 97%   BMI 35.58 kg/m²     Physical Exam  Vitals and nursing note reviewed. Constitutional:       General: He is not in acute distress. Appearance: Normal appearance. He is well-developed and well-groomed. He is not ill-appearing or toxic-appearing. HENT:      Head: Normocephalic and atraumatic. Eyes:      Extraocular Movements: Extraocular movements intact. Conjunctiva/sclera: Conjunctivae normal.   Cardiovascular:      Rate and Rhythm: Normal rate and regular rhythm. Pulses: Normal pulses. Heart sounds: Normal heart sounds, S1 normal and S2 normal.   Pulmonary:      Effort: Pulmonary effort is normal. No accessory muscle usage or respiratory distress. Breath sounds: Normal breath sounds. Abdominal:      General: Bowel sounds are normal.      Palpations: Abdomen is soft. Musculoskeletal:      Cervical back: Neck supple. Right lower leg: No edema. Left lower leg: No edema. Lymphadenopathy:      Cervical: No cervical adenopathy. Skin:     General: Skin is warm and moist.      Capillary Refill: Capillary refill takes less than 2 seconds. Findings: No rash. Neurological:      General: No focal deficit present. Mental Status: He is alert and oriented to person, place, and time. Mental status is at baseline. Psychiatric:         Attention and Perception: Attention normal.         Mood and Affect: Mood normal.         Speech: Speech normal.         Behavior: Behavior normal. Behavior is cooperative. Assessment/Plan:   1. Fatigue, unspecified type  Patient presents today to reestablish care with myself and with concerns of chronic fatigue, severe snoring and witnessed episodes of apnea. Patient is unaware of his family history as he was adopted and has no knowledge of blood relatives. Exam is benign today. Discussed possible causes of fatigue and recommend labs as below. Recommend patient follow-up at [address] for further evaluation.   Patient agreeable. Referral provided. Advised patient I will make further recommendations based on lab results. Patient verbalized understanding agreeable to plan  - Iron and TIBC  - Ferritin  - Comprehensive Metabolic Panel  - CBC with Auto Differential  - Vitamin B12 & Folate  - TSH with Reflex  - Hemoglobin A1C  - [facility]    2. Witnessed episode of apnea  See #1.  [facility]    3.  Snoring  See #1.  [facility]

## 2022-12-20 NOTE — LETTER
[address] [address]  Phone: [phone]  Fax: [phone]    Chantelle Sauer CNP        December 20, 2022     Patient: Samantha Calero   YOB: 1995   Date of Visit: 12/20/2022       To Whom it May Concern:    Marcela Bains was seen in my clinic on 12/20/2022. If you have any questions or concerns, please don't hesitate to call.     Sincerely,         ZHANNA Yates CNP

## 2022-12-20 NOTE — PATIENT INSTRUCTIONS
Please read the healthy family handout that you were given and share it with your family. Please compare this printed medication list with your medications at home to be sure they are the same. If you have any medications that are different please contact us immediately at [phone]. Also review your allergies that we have listed, these may also include medications that you have not been able to tolerate, make sure everything listed is correct. If you have any allergies that are different please contact us immediately at [phone]. You may receive a survey in the mail or by email asking about your experience during your visit today. Please complete and return to us so we know how we are serving you.

## 2022-12-21 ENCOUNTER — TELEPHONE (OUTPATIENT)
Dept: FAMILY MEDICINE CLINIC | Age: 27
End: 2022-12-21

## 2022-12-21 DIAGNOSIS — R74.8 ELEVATED LIVER ENZYMES: Primary | ICD-10-CM

## 2022-12-21 LAB
A/G RATIO: 1.8 (ref 1.1–2.2)
ALBUMIN SERPL-MCNC: 4.8 G/DL (ref 3.4–5)
ALP BLD-CCNC: 136 U/L (ref 40–129)
ALT SERPL-CCNC: 154 U/L (ref 10–40)
ANION GAP SERPL CALCULATED.3IONS-SCNC: 17 MMOL/L (ref 3–16)
AST SERPL-CCNC: 81 U/L (ref 15–37)
BASOPHILS ABSOLUTE: 0.1 K/UL (ref 0–0.2)
BASOPHILS RELATIVE PERCENT: 0.9 %
BILIRUB SERPL-MCNC: 0.6 MG/DL (ref 0–1)
BUN BLDV-MCNC: 17 MG/DL (ref 7–20)
CALCIUM SERPL-MCNC: 9.7 MG/DL (ref 8.3–10.6)
CHLORIDE BLD-SCNC: 99 MMOL/L (ref 99–110)
CO2: 23 MMOL/L (ref 21–32)
CREAT SERPL-MCNC: 0.8 MG/DL (ref 0.9–1.3)
EOSINOPHILS ABSOLUTE: 0.5 K/UL (ref 0–0.6)
EOSINOPHILS RELATIVE PERCENT: 4.8 %
ESTIMATED AVERAGE GLUCOSE: 122.6 MG/DL
FERRITIN: 291.8 NG/ML (ref 30–400)
FOLATE: 17.42 NG/ML (ref 4.78–24.2)
GFR SERPL CREATININE-BSD FRML MDRD: >60 ML/MIN/{1.73_M2}
GLUCOSE BLD-MCNC: 100 MG/DL (ref 70–99)
HBA1C MFR BLD: 5.9 %
HCT VFR BLD CALC: 47.8 % (ref 40.5–52.5)
HEMOGLOBIN: 15.7 G/DL (ref 13.5–17.5)
IRON SATURATION: 40 % (ref 20–50)
IRON: 134 UG/DL (ref 59–158)
LYMPHOCYTES ABSOLUTE: 3.3 K/UL (ref 1–5.1)
LYMPHOCYTES RELATIVE PERCENT: 32.1 %
MCH RBC QN AUTO: 27.7 PG (ref 26–34)
MCHC RBC AUTO-ENTMCNC: 32.9 G/DL (ref 31–36)
MCV RBC AUTO: 84.3 FL (ref 80–100)
MONOCYTES ABSOLUTE: 0.6 K/UL (ref 0–1.3)
MONOCYTES RELATIVE PERCENT: 6 %
NEUTROPHILS ABSOLUTE: 5.7 K/UL (ref 1.7–7.7)
NEUTROPHILS RELATIVE PERCENT: 56.2 %
PDW BLD-RTO: 13 % (ref 12.4–15.4)
PLATELET # BLD: 231 K/UL (ref 135–450)
PMV BLD AUTO: 8.4 FL (ref 5–10.5)
POTASSIUM SERPL-SCNC: 4.4 MMOL/L (ref 3.5–5.1)
RBC # BLD: 5.68 M/UL (ref 4.2–5.9)
SODIUM BLD-SCNC: 139 MMOL/L (ref 136–145)
TOTAL IRON BINDING CAPACITY: 337 UG/DL (ref 260–445)
TOTAL PROTEIN: 7.4 G/DL (ref 6.4–8.2)
TSH REFLEX: 0.67 UIU/ML (ref 0.27–4.2)
VITAMIN B-12: 1002 PG/ML (ref 211–911)
WBC # BLD: 10.1 K/UL (ref 4–11)

## 2022-12-21 NOTE — TELEPHONE ENCOUNTER
Spoke with patient and advised on message. Orders placed and appt scheduled.   Referral placed for Dr. Anayeli Cuba

## 2022-12-21 NOTE — TELEPHONE ENCOUNTER
----- Message from ZHANNA Longoria - CNP sent at 12/21/2022  8:07 AM EST -----  Kidney function is stable. TSH, iron studies and CBC all look good. A1c is pending. Liver enzymes are significantly elevated. Recommend Hepatitis panel and patient follow-up with liver specialist - Dr. Fercho Narayanan.

## 2023-01-04 ENCOUNTER — NURSE ONLY (OUTPATIENT)
Dept: FAMILY MEDICINE CLINIC | Age: 28
End: 2023-01-04
Payer: OTHER GOVERNMENT

## 2023-01-04 DIAGNOSIS — R74.8 ELEVATED LIVER ENZYMES: ICD-10-CM

## 2023-01-04 LAB
ALBUMIN SERPL-MCNC: 4.2 G/DL (ref 3.4–5)
ALP BLD-CCNC: 148 U/L (ref 40–129)
ALT SERPL-CCNC: 109 U/L (ref 10–40)
AST SERPL-CCNC: 60 U/L (ref 15–37)
BILIRUB SERPL-MCNC: 0.3 MG/DL (ref 0–1)
BILIRUBIN DIRECT: <0.2 MG/DL (ref 0–0.3)
BILIRUBIN, INDIRECT: ABNORMAL MG/DL (ref 0–1)
TOTAL PROTEIN: 6.6 G/DL (ref 6.4–8.2)

## 2023-01-04 PROCEDURE — 36415 COLL VENOUS BLD VENIPUNCTURE: CPT | Performed by: NURSE PRACTITIONER

## 2023-01-16 ENCOUNTER — OFFICE VISIT (OUTPATIENT)
Dept: PULMONOLOGY | Age: 28
End: 2023-01-16
Payer: OTHER GOVERNMENT

## 2023-01-16 VITALS
DIASTOLIC BLOOD PRESSURE: 80 MMHG | BODY MASS INDEX: 36.48 KG/M2 | WEIGHT: 227 LBS | HEIGHT: 66 IN | RESPIRATION RATE: 16 BRPM | HEART RATE: 73 BPM | OXYGEN SATURATION: 96 % | SYSTOLIC BLOOD PRESSURE: 118 MMHG

## 2023-01-16 DIAGNOSIS — R06.83 SNORING: Primary | ICD-10-CM

## 2023-01-16 DIAGNOSIS — R06.81 WITNESSED APNEIC SPELLS: ICD-10-CM

## 2023-01-16 DIAGNOSIS — R73.03 PRE-DIABETES: ICD-10-CM

## 2023-01-16 DIAGNOSIS — E66.09 CLASS 2 OBESITY DUE TO EXCESS CALORIES WITHOUT SERIOUS COMORBIDITY WITH BODY MASS INDEX (BMI) OF 36.0 TO 36.9 IN ADULT: ICD-10-CM

## 2023-01-16 PROCEDURE — 99244 OFF/OP CNSLTJ NEW/EST MOD 40: CPT

## 2023-01-16 ASSESSMENT — SLEEP AND FATIGUE QUESTIONNAIRES
HOW LIKELY ARE YOU TO NOD OFF OR FALL ASLEEP WHILE SITTING INACTIVE IN A PUBLIC PLACE: 0
HOW LIKELY ARE YOU TO NOD OFF OR FALL ASLEEP WHILE WATCHING TV: 1
HOW LIKELY ARE YOU TO NOD OFF OR FALL ASLEEP WHILE SITTING AND READING: 2
NECK CIRCUMFERENCE (INCHES): 15
HOW LIKELY ARE YOU TO NOD OFF OR FALL ASLEEP WHILE SITTING QUIETLY AFTER LUNCH WITHOUT ALCOHOL: 2
HOW LIKELY ARE YOU TO NOD OFF OR FALL ASLEEP IN A CAR, WHILE STOPPED FOR A FEW MINUTES IN TRAFFIC: 0
HOW LIKELY ARE YOU TO NOD OFF OR FALL ASLEEP WHILE SITTING AND TALKING TO SOMEONE: 0
HOW LIKELY ARE YOU TO NOD OFF OR FALL ASLEEP WHEN YOU ARE A PASSENGER IN A CAR FOR AN HOUR WITHOUT A BREAK: 3
HOW LIKELY ARE YOU TO NOD OFF OR FALL ASLEEP WHILE LYING DOWN TO REST IN THE AFTERNOON WHEN CIRCUMSTANCES PERMIT: 2
ESS TOTAL SCORE: 10

## 2023-01-16 NOTE — PROGRESS NOTES
PULMONARY, CRITICAL CARE AND SLEEP MEDICINE   Outpatient Sleep Consult Note  CC: Snoring  Referring Provider: Patient is being seen at the request of Yessica Ramirez CNP, for a consultation to evaluate for Obstructive Sleep Apnea. Presenting HPI 1/16/2023:  25 yo male with a several year history of severe snoring, no known modifying factors, associated with observed apneas brought to his attention after having surgery in Aug 2022 for cyst removal and subsequently mentioned by his girlfriend since then. No treatments tried so far & has not been evaluated for sleep apnea in the past.  He is generally satisfied with his sleep. It has always taken him about 1 hour to fall asleep but he stays up watching TV until falling asleep & admits that sometimes this keeps him up late and limits his sleep time. Gets ~ 5 hrs of sleep per night on week days and 8+ hours on weekends, which helps him feel much better. 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 To restroom rarelyx/night. Thompson is 10. No car wrecks/near wrecks because of the sleepiness or nodding off while driving. Drinks several caffinated beverages/day; now drinking zero sugar soda after being dx with pre-DM. Never smoker but does vape. Pt was adopted so unknown family history of CHRIS.     reports that he has been smoking cigarettes. He has never used smokeless tobacco.    Past Medical History:   Diagnosis Date    Allergic     COVID 10/22/2021     Past Surgical History:   Procedure Laterality Date    CYST REMOVAL  08/2022     Allergies   Allergen Reactions    Penicillins      Medication list was reviewed and updated as needed in Epic.    family history includes Other in an other family member. Review of Systems: Complete Review of system reviewed with patient and noted on attached review of system sheet. PHYSICAL EXAM:  Blood pressure 118/80, pulse 73, resp.  rate 16, height 5' 6\" (1.676 m), weight 227 lb (103 kg), SpO2 96 %.'   227# Jan 2023;   Constitutional:  No acute distress. Pleasant. HENT:  Oropharynx is clear and moist. No thyromegaly. Mallampati class IV airway. Eyes:  Conjunctivae are normal. Pupils equal, round, and reactive to light. No scleral icterus. Neck:  No tracheal deviation present. No obvious thyroid mass. Circumference is 15 inches. CV:  Normal rate, regular rhythm, normal heart sounds. No lower extremity edema. Pulm/Chest:  Clear breath sounds. No wheezes, rales or rhonchi. No accessory muscle usage or stridor. Moves air well. Abdominal:  Soft. No distension or obvious hernia. No tenderness or guarding. Musculoskeletal:  No cyanosis. No clubbing. No obvious joint deformity. Skin:  Skin is warm and dry. No rash or nodules on the exposed extremities. Psychiatric:  Normal mood and affect. Behavior is normal.    Neurological:  Alert, awake and oriented. No obvious cranial nerve deficits. Speech fluent    DATA:  Review of PCP & lab records    ASSESSMENT:  Witnessed apneas, by surgical team & girlfriend  Daytime sleepiness  Snoring  Chronic fatigue  Obesity  Comorbid conditions: elevated liver enzymes, pre-DM  Never smoker, vapes    PLAN:   Sleep hygiene tips discussed & provided  Specifically cautioned: absolutely no driving motorized vehicles or operating heavy machinery while fatigued, drowsy or sleepy   Weight loss is recommended as a long-term intervention. Discussed diet & exercise.    Pathophysiology & complications of untreated CHRIS were discussed to include systemic HTN, pulmonary HTN, CV morbidities, car accidents & all-cause mortality   PSG   F/U after; 31-90 days if receives machine

## 2023-01-16 NOTE — PATIENT INSTRUCTIONS
GOALS:   -  Scheduled aerobic exercise 20-30 minutes a day, for 5 days a week. You can start at just a couple minutes & work your way up. You should get your heart rate elevated and you should be short of breath, only able to speak about 3 words at a time. It won't be easy, but it is what it takes to get your body conditioned. You will start gaining your stamina and building your strength.   -  Making healthy substitutions for junk foods that are high in sugars and carbs (great job substituting zero sugar soda)   -  Goal to eventually cut out vaping   It was great to meet you and take care Edy!  -Marla      What's next:  First, you will schedule a study with the sleep lab (call them if you don't hear from them.)  Work on sleep hygiene while awaiting your sleep study (tips listed below).  We will call you with the results of the sleep study and let you know our recommendations from there.    If you have sleep apnea, you will likely go into the sleep lab to get set up with CPAP and the settings will get customized to you.  The alternative is attempting to do this from home, where the CPAP machine tries to auto-adjust to the correct settings.    We will ask you what medical supply company (\"DME company\") we should send the CPAP prescription to (see list of companies below).  They will get you set up with your machine and equipment.  I recommend trying to de-sensitize yourself to the CPAP & mask--people often adjust better if they try it out during the day and practice breathing with it.  You can ask your DME for a different mask if what you first tried isn't comfortable.  Also, feel free to call the office if the air pressures are not tolerable--I can send in an order for them to be changed while keeping your treatment efficacy in mind.   After getting set up with CPAP, you come back and see me within 31-90 days.  At this appointment, insurance will need to see that you are using the device at least 4 hours  each night for at least 70% of nights in a month.        ______________________________________________________________________  Never drive a car or operate a motorized vehicle while drowsy or sleepy.   ______________________________________________________________________        Sleep Hygiene. .. Important practices for better sleep:    Avoid naps. This will ensure you are sleepy at bedtime. If you have to take a nap, sleep less than 1 hour, before 3 pm.  SCHEDULE: Have a fixed bedtime and awakening time. The human body thrives on routines. Only deviate from these set sleep times about 1-2 hours on the weekends (more than this will start altering your internal clock). You will feel better keeping a regular sleep cycle and giving your body a dependable pattern, even (especially) if you are retired or not working. Use light to train your biological clock: When you get up in the morning, exposure yourself to bright lights. When preparing for bed, dim the lights and avoid exposure to screens. The blue light from electronic screens tells our brain that it is time to be awake; it inhibits melatonin production which stops our brain from helping us get to sleep. Go to bed only when sleepy; this reduces the time you are awake in bed (which can lead to frustration and negative thoughts about sleep). If you can't fall asleep within 15-30 minutes, get up and do something boring until you feel sleepy again. Sit quietly in the dark or read the warranty on your refrigerator. Don't expose yourself to bright light during this time (especially screens), which would cue your brain that it is time to be awake. Regular exercise is recommended to help you deepen your sleep (and MANY other reasons). Timing can be important--aim to exercise in the morning or early afternoon, ideally not within 4-6 hours of your bedtime if your schedule allows. Only use your bed for sleeping & intimacy.  Do not use your bed as an office, workroom or recreation room. Let your mind/body \"know\" that the bed is associated with sleeping. Develop sleep rituals. Give your body clues it is time to slow down and sleep. Dim the lights and turn off all screens! Examples include; yoga, deep breathing, listen to relaxing music, a cup of caffeine-free hot tea, or do a few minutes of reading (in dim light). A hot bath ~90 mins before bed will raise your body temperature, but it is the drop in body temperature that can help you feel sleepy. Stay away from stimulants such as caffeine and nicotine for at least 4-6 hours before bed. Stimulants can interfere with your ability to fall asleep. Caffeine is found in tea, cola, coffee, cocoa and chocolate. Nicotine is found in tobacco products. Avoid heavy, spicy or sugary foods 4-6 hours before bedtime   Avoid alcohol 4-6 hours before bedtime. Alcohol has an immediate sleep-inducing effect, but after a few hours when alcohol levels fall there is a stimulant or \"wake-up\" effect and will cause fragmented sleep. Dont take worries to bed. Leave worries about life, work, school etc. behind you when you go to bed. Some people find it helpful to assign a worry period in the evening or late afternoon to talk or write down the worries and get them out of your system. Ensure your bedroom is quiet and comfortable. A cooler room along with enough blankets to stay warm is recommended. If your room is too noisy, try a white noise machine. If too bright, try black out shades or an eye mask. Uncomfortable bedding can prevent good sleep. Evaluate whether or not this is a source of your problem, and make appropriate changes.           ______________________________________________________________________                        Kailey Alicea 57 at 215 [facility], [address] [place] [address], [address] [place] [address]                  The Sleep center at [facility], [address], [place], [facility]                      Phone: [phone] Fax: [phone]                Dear [address]  Patient,    For in-lab testing please, bring with you:  Appropriate nightclothes (pajamas, sweats, etc.), slippers and robe  All medications you will need during you stay, including any breathing medications, nebulizers and metered dose inhalers  Your own toiletries and hairdryer if you wish to shower before you leave  Current photo I.D. and Insurance card  We do not allow any pillows or bed linens from home due to health regulations  We recommend that you not bring valuables with you to the Sleep Center, as we cannot be responsible for any lost or damaged personal items. Please be aware that [facility] is a non-smoking facility. There is no smoking allowed anywhere on [facility] property at any time. Each patient room is a private room with a private bathroom. The in-lab test itself consist of electrodes and sensors attached to specific areas of your scalp, face, chest and legs. We will also monitor respiratory effort, nasal and oral airflow and oxygen levels. The test will not hurt- it is painless and not invasive in any way. For at home testing: when you come to  the rental unit, please bring:  -     I.D. and Insurance card    ** Please note the Home Sleep Test Units are limited. It is a 1 night rental and must be dropped off the following day to ensure you are not billed a late or replacement fee. **    Please refrain from or reduce the use of caffeine and/or alcohol prior to your sleep study and avoid napping the day of your study, as these will affect the accuracy of your test results. If you are ill the day of your test (COVID-19, cold, upper respiratory infection, flu, etc.) please call to reschedule your test as the test will not be accurate, and for other patient safety.     If you should have any questions or need to cancel or reschedule your appointment, please call the [facility] location. (Even if your test has been scheduled at our [place] location)   [phone]      Thank you,   Sleep Centers at [facility] AT [place]         ______________________________________________________________________        List of [address] [place] [address] / \"DME\" companies:     [address]   [phone] [id number] RUFUSWWKY [facility], 727 [facility]       ([facility]) [phone] [facility] [address] Elise, [address]   [facility] Cpap [phone] [address] [phone] [address] [place] [address]. [address] [place] [address]  **Near [address] [place] [address],[address] [phone]  [place], [address]   [address] Cox [place] [phone] or  270 [facility] 64, [address] [place] [address]**   [facility] [address], Luige Elier 10   [address] [place] [address] [phone] opt4 opt2 [phone] Fax Order to them and they will forward to [facility] [address], [address] [facility] [phone] or  [phone] [address], [address]  **Near [place]   CPAPSplice Machine - Gian Schein, not DME  No insurance, Cash ONLY [phone]  800-Cpap.com [phone]    CPAP. [address] Amiral Courbet (online) [phone] Online   DASCO [phone]  [phone]  [phone] [address] [place], [place], Levon [address] [place] [address]  Oxygen/PAP supplies only [phone] [address], 1300 [facility], [address] [place] [address]  Oxygen/PAP/ 7410 528 [facility] [address]  Oxygen/PAP/NIV [phone] [place]   [place], [address]   Cecilia Vermillion  Oxygen/PAP/NIV [phone] [address]  [address] 254 [facility],2Nd Floor  Oxygen/PAP/ [address], [facility]   [address]  Oxygen/PAP/NIV [phone] [facility], 1400 W [facility]   [facility]  ([address])  Oxygen/ 030 [address] 50  [place], 99 [facility]   [address]  (Portable O2 Conctrator) [phone] 57361 [facility],1St Floor  [place], Kriss [facility] Facility[address]   Leading Respiratory  Oxygen/-[phone] 76 [address] [place], [address]  **Past [address]  ([place] [address])  Oxygen/PAP/NIV   [phone]     [phone]   [address], [facility] [address]23  **[facility] 100 [facility]  Oxygen/PAP/-[phone]  [address], [address]   Rosebud Guardian  Oxygen/PAP/-[phone] [address] Bird Osorio [address] [facility], 300 [facility]   Romain Ernesto  Oxygen/PAP/-[phone] [address] [facility], [address]   Duane Favors  Oxygen/PAP/-[phone] 1117 [facility], 233 [facility]   Orie Garrett  Oxygen/PAP/-[phone] 5165 Natasha Leno  [facility]  Oxygen/-[phone] 620 [facility]  **[place] inside [address]  ([address])  Oxygen/PAP/NIV   [phone] [address]  [place], [address]  **[address] above [facility]  Oxygen/-[phone] [place] [address]  Oxygen/-[phone] [facility] NUNEZ [address], [facility]  **NE of [address]  Oxygen/-[phone] [facility]. [address]   Patient Aids -- Doc [place]   Oxygen/PAP/NIV [phone] 899 [facility]  Doc [place], [address] [place]   Patient Aids Sherlyn Copier  Oxygen/PAP/-[phone]  Option 4 [phone] 5940 [facility].  Constantino Latham, 100 [facility]   [facility] [phone] , [place], 30 [facility]  Oxygen/PAP/NIV [address] [place] [address] Pkw[address] 2011 [facility] [address] [place] [address]  Oxygen/PAP/NIV [address] 1325 [facility], [address] [facility] [address] 434  Oxygen/PAP/NIV 47 [facility]  [phone] [address]. [facility], [address]   [facility] [place]e  Oxygen/PAP/NIV [address], 8105 [facility]   [facility]  (formerly [facility])  CPAP only [address], [address]  **[address] B [facility] 60 [phone]    1-800 CPAP (store) 2301 [facility],[address]  [phone]    25[address] .[place] [address], [place] [phone] 511  544,[address]     [place] 66 [phone]                 ICP (CTUX) [phone]             24701 [facility]         (no PAP equipment)  [phone] 2601 [facility]

## 2023-04-18 ENCOUNTER — TELEPHONE (OUTPATIENT)
Dept: FAMILY MEDICINE CLINIC | Age: 28
End: 2023-04-18